# Patient Record
Sex: MALE | Race: OTHER | HISPANIC OR LATINO | Employment: UNEMPLOYED | URBAN - METROPOLITAN AREA
[De-identification: names, ages, dates, MRNs, and addresses within clinical notes are randomized per-mention and may not be internally consistent; named-entity substitution may affect disease eponyms.]

---

## 2022-06-07 ENCOUNTER — HOSPITAL ENCOUNTER (EMERGENCY)
Facility: HOSPITAL | Age: 42
Discharge: HOME | End: 2022-06-07
Attending: EMERGENCY MEDICINE
Payer: MEDICAID

## 2022-06-07 VITALS
HEIGHT: 70 IN | HEART RATE: 60 BPM | BODY MASS INDEX: 25.77 KG/M2 | WEIGHT: 180 LBS | SYSTOLIC BLOOD PRESSURE: 118 MMHG | DIASTOLIC BLOOD PRESSURE: 75 MMHG | RESPIRATION RATE: 18 BRPM | TEMPERATURE: 98 F | OXYGEN SATURATION: 97 %
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
## 2022-06-07 DIAGNOSIS — S20.219A CONTUSION OF STERNUM, INITIAL ENCOUNTER: Primary | ICD-10-CM

## 2022-06-07 PROCEDURE — 25000003 PHARM REV CODE 250: Performed by: PHYSICIAN ASSISTANT

## 2022-06-07 PROCEDURE — 99283 EMERGENCY DEPT VISIT LOW MDM: CPT | Mod: 25

## 2022-06-07 RX ORDER — MELOXICAM 7.5 MG/1
7.5 TABLET ORAL DAILY
Qty: 12 TABLET | Refills: 0 | Status: SHIPPED | OUTPATIENT
Start: 2022-06-07 | End: 2023-02-07 | Stop reason: ALTCHOICE

## 2022-06-07 RX ORDER — IBUPROFEN 600 MG/1
600 TABLET ORAL
Status: COMPLETED | OUTPATIENT
Start: 2022-06-07 | End: 2022-06-07

## 2022-06-07 RX ADMIN — IBUPROFEN 600 MG: 600 TABLET ORAL at 11:06

## 2022-06-07 NOTE — DISCHARGE INSTRUCTIONS
Mèsi paske w te margarito nan Depatman Ijans nou an corin a. Li enpòtan gena w sonje ke kèk pwoblèm yo difisil gena dyagnostike epi yo ka pa jwenn nan premye vizit ou a. Asire w ou fè swivi radha martinez swen prensipal ou.  Si ou erasto rojo, ou bryan rojo ki endike louis papye egzeyat ou a oswa ou ka larry [facility] [phone] gena pran yon stepan rojo.    Retounen nan ER la ak nenpòt kesyon/enkyetid, nouvo/deansènjohnny sentòm, nick pi grav oswa echèk gena amelyore. Erasto love oswa pran okenn desizyon enpòtan gena 24 èdtan si ou te resevwa madiha medikaman gena doulè, sedatif oswa dwòg seth cummins pandan vizit ER ou.
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
## 2023-02-07 ENCOUNTER — HOSPITAL ENCOUNTER (EMERGENCY)
Facility: HOSPITAL | Age: 43
Discharge: HOME | End: 2023-02-07
Attending: EMERGENCY MEDICINE
Payer: MEDICAID

## 2023-02-07 VITALS
SYSTOLIC BLOOD PRESSURE: 131 MMHG | DIASTOLIC BLOOD PRESSURE: 76 MMHG | BODY MASS INDEX: 26.52 KG/M2 | HEIGHT: 68 IN | HEART RATE: 63 BPM | WEIGHT: 175 LBS | RESPIRATION RATE: 17 BRPM | TEMPERATURE: 98 F | OXYGEN SATURATION: 99 %

## 2023-02-07 DIAGNOSIS — K04.01 PULPITIS: ICD-10-CM

## 2023-02-07 DIAGNOSIS — K08.89 PAIN, DENTAL: Primary | ICD-10-CM

## 2023-02-07 PROCEDURE — 25000003 PHARM REV CODE 250: Performed by: NURSE PRACTITIONER

## 2023-02-07 PROCEDURE — 99284 EMERGENCY DEPT VISIT MOD MDM: CPT

## 2023-02-07 RX ORDER — NAPROXEN 500 MG/1
500 TABLET ORAL EVERY 12 HOURS
Qty: 10 TABLET | Refills: 0 | Status: SHIPPED | OUTPATIENT
Start: 2023-02-07 | End: 2023-02-12

## 2023-02-07 RX ORDER — AMOXICILLIN AND CLAVULANATE POTASSIUM 875; 125 MG/1; MG/1
1 TABLET, FILM COATED ORAL
Status: COMPLETED | OUTPATIENT
Start: 2023-02-07 | End: 2023-02-07

## 2023-02-07 RX ORDER — AMOXICILLIN AND CLAVULANATE POTASSIUM 875; 125 MG/1; MG/1
1 TABLET, FILM COATED ORAL 2 TIMES DAILY
Qty: 14 TABLET | Refills: 0 | Status: SHIPPED | OUTPATIENT
Start: 2023-02-07

## 2023-02-07 RX ORDER — NAPROXEN 500 MG/1
500 TABLET ORAL
Status: COMPLETED | OUTPATIENT
Start: 2023-02-07 | End: 2023-02-07

## 2023-02-07 RX ADMIN — NAPROXEN 500 MG: 500 TABLET ORAL at 10:02

## 2023-02-07 RX ADMIN — AMOXICILLIN AND CLAVULANATE POTASSIUM 1 TABLET: 875; 125 TABLET ORAL at 10:02

## 2023-02-07 NOTE — ED PROVIDER NOTES
Encounter Date: 2/7/2023    SCRIBE #1 NOTE: I, Jayshree Tapia, am scribing for, and in the presence of,  ESTEFANIA Webster. I have scribed the following portions of the note - Other sections scribed: HPI, ROS.     History     Chief Complaint   Patient presents with    Dental Pain     Pt c/o left sided lower jaw pain x 1 week. Pt states his teeth on the left lower jaw are very painful. Pt denies being evaluated by a dentist or taking any medications to relieve symptoms.      CC: dental pain    HPI: This is a 42 y.o.male patient, with no pertinent PMHx, presenting to the ED for further evaluation of dental pain beginning a week ago. Patient states there is pain to his left lower teeth specifically the 4th and 5th tooth. Patient reports associated symptoms of left-sided jaw pain and left ear pain. Patient denies taking any medications to help alleviate the pain. Patient denies seeing a dentist regularly regarding his symptoms. Patient denies any fever, sore throat, or any other associated symptoms. No alleviating or aggravating factors. No known drug allergies.              The history is provided by the patient. A  was used ([employer]#[id number]).   Review of patient's allergies indicates:  No Known Allergies  History reviewed. No pertinent past medical history.  History reviewed. No pertinent surgical history.  History reviewed. No pertinent family history.  Social History     Tobacco Use    Smoking status: Never    Smokeless tobacco: Never   Substance Use Topics    Drug use: Never     Review of Systems   Constitutional:  Negative for fever.   HENT:  Positive for dental problem (left lower 4th and 5th tooth) and ear pain (left). Negative for congestion, ear discharge, sore throat and trouble swallowing.         (+) left lower jaw pain   Gastrointestinal:  Negative for diarrhea, nausea and vomiting.   Musculoskeletal:  Negative for neck pain and neck stiffness.   Skin:  Negative for color change, pallor,  rash and wound.   Neurological:  Negative for seizures, syncope, speech difficulty and weakness.   Psychiatric/Behavioral:  Negative for confusion.      Physical Exam     Initial Vitals [02/07/23 0952]   BP Pulse Resp Temp SpO2   131/76 63 17 97.6 °F (36.4 °C) 99 %      MAP       --         Physical Exam    Nursing note and vitals reviewed.  Constitutional: He appears well-developed and well-nourished. He is not diaphoretic. He is cooperative.  Non-toxic appearance. He does not have a sickly appearance. He does not appear ill. No distress.   HENT:   Head: Normocephalic and atraumatic.   Right Ear: External ear normal.   Left Ear: External ear normal.   Nose: Nose normal.   Mouth/Throat: Uvula is midline, oropharynx is clear and moist and mucous membranes are normal. No trismus in the jaw. Abnormal dentition. Dental caries present. No dental abscesses or uvula swelling.   Dentition throughout the mouth with tenderness percussion over teeth 18, 19, and 20.  Some mild gingival erythema surrounding.  No abscess.  No sublingual swelling or elevation.  Anterior neck spaces soft.   Neck: Phonation normal.   Normal range of motion.  Cardiovascular:  Intact distal pulses.           Musculoskeletal:      Cervical back: Normal range of motion.     Neurological: He is alert and oriented to person, place, and time. No sensory deficit. Coordination normal. GCS eye subscore is 4. GCS verbal subscore is 5. GCS motor subscore is 6.   Skin: Skin is warm, dry and intact. Capillary refill takes less than 2 seconds. No bruising, no laceration and no rash noted. No cyanosis or erythema.   Psychiatric: He has a normal mood and affect. His speech is normal and behavior is normal. Judgment and thought content normal.       ED Course   Procedures  Labs Reviewed - No data to display       Imaging Results    None          Medications   naproxen tablet 500 mg (500 mg Oral Given 2/7/23 1017)   amoxicillin-clavulanate 875-125mg per tablet 1  tablet (1 tablet Oral Given 2/7/23 1017)     Medical Decision Making:   History:   Old Medical Records: I decided to obtain old medical records.     APC / Resident Notes:   This is an evaluation of a 42 y.o. male that presents to the Emergency Department for dental pain. The patient reports no fever, chills, nausea, vomiting, or inability to open mouth. Physical Exam shows a non-toxic, afebrile, and well appearing male with TTP of teeth # 18,19, and 20. There is no facial swelling. There is no visible oral drainable abscess at this time. He has no facial cellulitis, oral swelling, airway compromise, sublingual swelling/elevation, or trismus. Neck spaces are soft. There is gingival erythema surrounding the teeth. Vital signs are reassuring.     My overall impression is dental pain, pulpitis. I considered, but at this time, do not suspect Alex's angina, deep space infection, peritonsillar infection, pharyngitis, mastoiditis, or a facial cellulitis.    D/C Meds:  Augmentin and naproxen. Additional D/C Information:  Dental discharge instructions. The diagnosis, treatment plan, instructions for follow-up and reevaluation with a dentist as well as ED return precautions were discussed and understanding was verbalized. All questions or concerns have been addressed.  TONG Chiu, FNP-C   Scribe Attestation:   Scribe #1: I performed the above scribed service and the documentation accurately describes the services I performed. I attest to the accuracy of the note.                   Clinical Impression:   Final diagnoses:  [K08.89] Pain, dental (Primary)  [K04.01] Pulpitis        ED Disposition Condition    Discharge Stable          ED Prescriptions       Medication Sig Dispense Start Date End Date Auth. Provider    naproxen (NAPROSYN) 500 MG tablet Take 1 tablet (500 mg total) by mouth every 12 (twelve) hours. Do not take any additional NSAIDs while you are taking this medication including (Advil, Aleve, Motrin,  Ibuprofen, Mobic\meloxicam, Naprosyn, Toradol, ketoralac, etc.). for 5 days 10 tablet 2/7/2023 2/12/2023 ESTEFANIA Webster    amoxicillin-clavulanate 875-125mg (AUGMENTIN) 875-125 mg per tablet Take 1 tablet by mouth 2 (two) times daily. 14 tablet 2/7/2023 -- ESTEFANIA Webster          Follow-up Information       Follow up With Specialties Details Why Contact Info    A Dentist  Schedule an appointment as soon as possible for a visit  For Follow-Up, This Week     [facility] [place] Go to  If symptoms worsen [address] [facility] [address] [phone]              ICARIDAD APRN, ESTEFANIA-C, personally performed the services described in this documentation. All medical record entries made by the scribe were at my direction and in my presence. I have reviewed the chart and agree that the record reflects my personal performance and is accurate and complete.       ESTEFANIA Webster  02/07/23 1021

## 2023-02-07 NOTE — DISCHARGE INSTRUCTIONS
Tuyet un seguimiento con un dentista lo antes posible para geovany reevaluación de los síntomas; si no tiene sara, puede hacer un seguimiento con geovany de las clínicas dentales de la lista que se le entregará con las instrucciones de virgie.    Deseret todos hemal antibióticos según lo recetado, incluso si se siente mejor o hemal síntomas desaparecen. Le de guzman recetado naproxeno para el dolor. Fay es un medicamento antiinflamatorio no esteroideo (DANYEL). No tome ningún DANYEL adicional mientras esté tomando fay medicamento, incluidos (Advil, Aleve, Motrin, Ibuprofen, Mobic\meloxicam, Naprosyn, Toradol, ketoralac, etc.). Deje de hawk fay medicamento si experimenta: debilidad, picazón, piel u ojos amarillos, mariha en las articulaciones, vómitos con ata, ata o heces negras, aumento de peso inusual o hinchazón en los brazos, las piernas, las huey o los pies.    Regrese a la jonas de emergencias por cualquier síntoma que empeore, incluidos: fiebre, hinchazón/enrojecimiento facial, dificultad para abrir la boca/respirar/tragar o nuevos síntomas u otras inquietudes.    Clínicas dentales locales. Llame o visite el sitio web dental de Medicaid a continuación:  [facility]:  [address], [address]; [place] [address] [phone] - [employer] [facility]  [address] [place] [address] [phone] - [employer]    [facility] -  [address], [address], [place] [address] [phone]    Pacientes con [facility] de Medicaid:  Puede ir a [email or web address] para encontrar geovany lista de dentistas en el área que aceptan Medicaid. Por favor llame a los números para programar geovany mauricio.    ------    Please follow up with a dentist as soon as possible for reevaluation of symptoms, if you do not have one you can follow up with one of the dental clinics from the list that will given to you with your discharge instructions.    Please take all your  antibiotics as prescribed even if your are feeling better or your symptoms resolve. You have been prescribed Naproxen for pain. This is an Non-Steroidal Anti-Inflammatory (NSAID) Medication. Please do not take any additional NSAIDs while you are taking this medication including (Advil, Aleve, Motrin, Ibuprofen, Mobic\meloxicam, Naprosyn, Toradol, ketoralac, etc.). Please stop taking this medication if you experience: weakness, itching, yellow skin or eyes, joint pains, vomiting blood, blood or black stools, unusual weight gain, or swelling in your arms, legs, hands, or feet.     Please return to the ER for any worsening symptoms including: fever, facial swelling/redness, difficulty opening your mouth/breathing/swallowing or new symptoms or other concerns.    Local Dental Clinics. Please call or visit the Medicaid Dental Website Below:   [facility]:  [address], [address]; [place] [address] [phone] - Accepts Medicaid    [facility]  [address] [place] [address] [phone] - Accepts Medicaid    [facility] -  [address] [place] [address], [address], [facility] [address] [phone]    Patients with Medicaid Dental Coverage:  You can go to [email or web address]  To find a list of dentists in the area that accept Medicaid. Please call the numbers to schedule an appointment.

## 2024-05-20 ENCOUNTER — HOSPITAL ENCOUNTER (EMERGENCY)
Facility: HOSPITAL | Age: 44
Discharge: HOME | End: 2024-05-20
Attending: EMERGENCY MEDICINE
Payer: COMMERCIAL

## 2024-05-20 VITALS
HEART RATE: 76 BPM | SYSTOLIC BLOOD PRESSURE: 128 MMHG | HEIGHT: 67 IN | BODY MASS INDEX: 26.68 KG/M2 | TEMPERATURE: 99 F | RESPIRATION RATE: 18 BRPM | OXYGEN SATURATION: 97 % | DIASTOLIC BLOOD PRESSURE: 76 MMHG | WEIGHT: 170 LBS

## 2024-05-20 DIAGNOSIS — M25.559 HIP PAIN: ICD-10-CM

## 2024-05-20 DIAGNOSIS — M25.512 SHOULDER PAIN, LEFT: ICD-10-CM

## 2024-05-20 DIAGNOSIS — T14.8XXA MUSCLE STRAIN: ICD-10-CM

## 2024-05-20 DIAGNOSIS — R10.9 FLANK PAIN: ICD-10-CM
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
## 2024-05-20 PROCEDURE — 99284 EMERGENCY DEPT VISIT MOD MDM: CPT | Mod: 25

## 2024-05-20 PROCEDURE — 25000003 PHARM REV CODE 250: Performed by: EMERGENCY MEDICINE

## 2024-05-20 PROCEDURE — 25000003 PHARM REV CODE 250

## 2024-05-20 RX ORDER — IBUPROFEN 400 MG/1
800 TABLET ORAL
Status: COMPLETED | OUTPATIENT
Start: 2024-05-20 | End: 2024-05-20

## 2024-05-20 RX ORDER — CYCLOBENZAPRINE HCL 10 MG
10 TABLET ORAL 3 TIMES DAILY PRN
Qty: 15 TABLET | Refills: 0 | Status: SHIPPED | OUTPATIENT
Start: 2024-05-20

## 2024-05-20 RX ORDER — MUPIROCIN 20 MG/G
1 OINTMENT TOPICAL
Status: COMPLETED | OUTPATIENT
Start: 2024-05-20 | End: 2024-05-20

## 2024-05-20 RX ORDER — IBUPROFEN 400 MG/1
800 TABLET ORAL
Status: DISCONTINUED | OUTPATIENT
Start: 2024-05-20 | End: 2024-05-20

## 2024-05-20 RX ORDER — NAPROXEN 500 MG/1
500 TABLET ORAL 2 TIMES DAILY WITH MEALS
Qty: 20 TABLET | Refills: 0 | Status: SHIPPED | OUTPATIENT
Start: 2024-05-20 | End: 2024-05-30

## 2024-05-20 RX ADMIN — MUPIROCIN 1 TUBE: 20 OINTMENT TOPICAL at 07:05

## 2024-05-20 RX ADMIN — IBUPROFEN 800 MG: 400 TABLET ORAL at 07:05
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
## 2024-05-21 NOTE — DISCHARGE INSTRUCTIONS
Aplike yon bandaj ACE konpresiv. Repoze epi elve zòn ki fè mal ki afekte a. Aplike konprès frèt tanzantan arpit sa nesesè. Pandan doulè a ap bese, kòmanse aktivite nòmal danny dousman arpit yo tolere. Rele si sentòm yo pèsiste.  Tanpri kenbe blesi ou pwòp epi sèk. Lave dousman ak cameron ak dlo epi aplike odè antibyotik (bacitracin, neosporin, elatriye) louis blesi a apre lave. Tanpri abhishek gena siy enfeksyon tankou: ogmante/woujman chikis, anfle, koule tankou pi, oswa yon lafyèv ki pi gran pase 100.4F. Si w gen nenpòt nan sa yo, tanpri kontakte [place] ou oswa Retounen nan Depatman Ijans gena yon tcheke blesi.    Tanpri swiv doktè [place] ou a nan 3-5 negrita gena re-tcheke blesi. Ou ka retounen nan Depatman Ijans si ou pa kapab wè [place] ou a. Tanpri retounen nan ER la gena nenpòt ki nouvo sentòm oswa ki nick pi grav.  Pran tout medikaman arpit yo preskri.  Naproxen se nan anti-enflamatwa. Pran medikaman sa a ak manje gena evite nenpòt iritasyon nan vant. Ou ka pran 650-1000mg tylenol ant dòz gena doulè. Flexeril se yon relaxer nan misk. Ranpli relaxers nan misk arpit yo preskri. Fè atansyon, medikaman sa a se sedatif / ka lakòz ogmante dòmi. Pa melanje ak alkòl oswa nenpòt lòt medikaman sedatif. Pa ivan oswa opere angela lè w ap pran medikaman sa a.